# Patient Record
Sex: MALE | Race: WHITE | NOT HISPANIC OR LATINO | Employment: PART TIME | URBAN - METROPOLITAN AREA
[De-identification: names, ages, dates, MRNs, and addresses within clinical notes are randomized per-mention and may not be internally consistent; named-entity substitution may affect disease eponyms.]

---

## 2020-07-19 ENCOUNTER — HOSPITAL ENCOUNTER (EMERGENCY)
Facility: HOSPITAL | Age: 20
Discharge: HOME | End: 2020-07-20
Attending: EMERGENCY MEDICINE
Payer: OTHER MISCELLANEOUS

## 2020-07-19 VITALS
DIASTOLIC BLOOD PRESSURE: 99 MMHG | SYSTOLIC BLOOD PRESSURE: 152 MMHG | TEMPERATURE: 98.8 F | WEIGHT: 205 LBS | OXYGEN SATURATION: 98 % | RESPIRATION RATE: 18 BRPM | HEART RATE: 116 BPM
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
## 2020-07-19 DIAGNOSIS — S00.81XA ABRASION OF FACE, INITIAL ENCOUNTER: ICD-10-CM

## 2020-07-19 PROCEDURE — 99285 EMERGENCY DEPT VISIT HI MDM: CPT | Performed by: EMERGENCY MEDICINE

## 2020-07-19 PROCEDURE — 99284 EMERGENCY DEPT VISIT MOD MDM: CPT

## 2020-07-19 RX ORDER — IBUPROFEN 600 MG/1
600 TABLET ORAL ONCE
Status: COMPLETED | OUTPATIENT
Start: 2020-07-19 | End: 2020-07-19

## 2020-07-19 RX ADMIN — IBUPROFEN 600 MG: 600 TABLET ORAL at 23:04

## 2020-07-20 ENCOUNTER — APPOINTMENT (EMERGENCY)
Dept: RADIOLOGY | Facility: HOSPITAL | Age: 20
End: 2020-07-20
Payer: OTHER MISCELLANEOUS

## 2020-07-20 PROCEDURE — 70486 CT MAXILLOFACIAL W/O DYE: CPT
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
## 2021-04-13 DIAGNOSIS — Z23 ENCOUNTER FOR IMMUNIZATION: ICD-10-CM

## 2021-04-27 ENCOUNTER — IMMUNIZATIONS (OUTPATIENT)
Dept: FAMILY MEDICINE CLINIC | Facility: HOSPITAL | Age: 21
End: 2021-04-27

## 2021-04-27 DIAGNOSIS — Z23 ENCOUNTER FOR IMMUNIZATION: Primary | ICD-10-CM

## 2021-04-27 PROCEDURE — 0001A SARS-COV-2 / COVID-19 MRNA VACCINE (PFIZER-BIONTECH) 30 MCG: CPT

## 2021-04-27 PROCEDURE — 91300 SARS-COV-2 / COVID-19 MRNA VACCINE (PFIZER-BIONTECH) 30 MCG: CPT

## 2021-05-21 ENCOUNTER — IMMUNIZATIONS (OUTPATIENT)
Dept: FAMILY MEDICINE CLINIC | Facility: HOSPITAL | Age: 21
End: 2021-05-21

## 2021-05-21 DIAGNOSIS — Z23 ENCOUNTER FOR IMMUNIZATION: Primary | ICD-10-CM

## 2021-05-21 PROCEDURE — 91300 SARS-COV-2 / COVID-19 MRNA VACCINE (PFIZER-BIONTECH) 30 MCG: CPT

## 2021-05-21 PROCEDURE — 0002A SARS-COV-2 / COVID-19 MRNA VACCINE (PFIZER-BIONTECH) 30 MCG: CPT

## 2021-12-28 ENCOUNTER — IMMUNIZATIONS (OUTPATIENT)
Dept: FAMILY MEDICINE CLINIC | Facility: HOSPITAL | Age: 21
End: 2021-12-28

## 2021-12-28 DIAGNOSIS — Z23 ENCOUNTER FOR IMMUNIZATION: Primary | ICD-10-CM

## 2021-12-28 PROCEDURE — 0001A COVID-19 PFIZER VACC 0.3 ML: CPT

## 2021-12-28 PROCEDURE — 91300 COVID-19 PFIZER VACC 0.3 ML: CPT

## 2023-09-29 ENCOUNTER — TELEPHONE (OUTPATIENT)
Age: 23
End: 2023-09-29

## 2023-09-29 NOTE — TELEPHONE ENCOUNTER
Patient's mother, Beverly Paige, called with patient in the background to access a copy of the patient's immunization report. They will try to access the report through [facility] and give a call back if they are unable to.

## 2023-10-09 ENCOUNTER — APPOINTMENT (OUTPATIENT)
Dept: LAB | Facility: MEDICAL CENTER | Age: 23
End: 2023-10-09

## 2023-10-09 ENCOUNTER — APPOINTMENT (OUTPATIENT)
Dept: URGENT CARE | Facility: MEDICAL CENTER | Age: 23
End: 2023-10-09

## 2024-01-18 ENCOUNTER — OFFICE VISIT (OUTPATIENT)
Dept: FAMILY MEDICINE CLINIC | Facility: CLINIC | Age: 24
End: 2024-01-18
Payer: COMMERCIAL

## 2024-01-18 VITALS
OXYGEN SATURATION: 97 % | DIASTOLIC BLOOD PRESSURE: 82 MMHG | RESPIRATION RATE: 16 BRPM | HEIGHT: 77 IN | BODY MASS INDEX: 35.19 KG/M2 | TEMPERATURE: 99.9 F | HEART RATE: 122 BPM | SYSTOLIC BLOOD PRESSURE: 144 MMHG | WEIGHT: 298 LBS

## 2024-01-18 DIAGNOSIS — J10.1 INFLUENZA A: Primary | ICD-10-CM

## 2024-01-18 LAB
SL AMB POCT RAPID FLU A: POSITIVE
SL AMB POCT RAPID FLU B: NEGATIVE

## 2024-01-18 PROCEDURE — 87804 INFLUENZA ASSAY W/OPTIC: CPT | Performed by: FAMILY MEDICINE

## 2024-01-18 PROCEDURE — 99202 OFFICE O/P NEW SF 15 MIN: CPT | Performed by: FAMILY MEDICINE

## 2024-01-18 RX ORDER — OSELTAMIVIR PHOSPHATE 75 MG/1
75 CAPSULE ORAL EVERY 12 HOURS SCHEDULED
Qty: 10 CAPSULE | Refills: 0 | Status: SHIPPED | OUTPATIENT
Start: 2024-01-18 | End: 2024-01-23

## 2024-01-18 RX ORDER — BROMPHENIRAMINE MALEATE, PSEUDOEPHEDRINE HYDROCHLORIDE, AND DEXTROMETHORPHAN HYDROBROMIDE 2; 30; 10 MG/5ML; MG/5ML; MG/5ML
5 SYRUP ORAL 4 TIMES DAILY PRN
Qty: 120 ML | Refills: 0 | Status: SHIPPED | OUTPATIENT
Start: 2024-01-18

## 2024-01-18 NOTE — ASSESSMENT & PLAN NOTE
Rapid influenza swab performed in the office was very quickly positive for influenza A    -Patient will be placed on Tamiflu 75 mg twice daily x 5 days    -Advised patient to self isolate and avoid contact with other people.  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

## 2024-01-18 NOTE — PROGRESS NOTES
"  Subjective:      Patient ID: Vijay Thomason is a 24 y.o. male.    24-year-old male presents as new patient to the practice as he has not been seen in several years complaining of a 2-day history of nasal congestion, cough, shortness of breath.  Patient works as a .  Works for [employer].  Was in work today.  Started working as a  October.  He did receive flu vaccination.  No COVID-19 test performed.  Has been taking Tylenol.        No past medical history on file.    No family history on file.    No past surgical history on file.     reports that he has never smoked. He has never used smokeless tobacco. He reports that he does not drink alcohol.      Current Outpatient Medications:   •  brompheniramine-pseudoephedrine-DM 30-2-10 MG/5ML syrup, Take 5 mL by mouth 4 (four) times a day as needed for allergies, Disp: 120 mL, Rfl: 0  •  oseltamivir (TAMIFLU) 75 mg capsule, Take 1 capsule (75 mg total) by mouth every 12 (twelve) hours for 5 days, Disp: 10 capsule, Rfl: 0    The following portions of the patient's history were reviewed and updated as appropriate: allergies, current medications, past family history, past medical history, past social history, past surgical history and problem list.    Review of Systems   Constitutional:  Positive for diaphoresis and fatigue. Negative for appetite change, chills and fever.   HENT:  Positive for congestion and sore throat. Negative for ear pain.    Respiratory:  Positive for cough and shortness of breath. Negative for choking, chest tightness, wheezing and stridor.    Cardiovascular: Negative.    Gastrointestinal: Negative.    Neurological:  Negative for headaches.   Hematological:  Negative for adenopathy.           Objective:    /82   Pulse (!) 122   Temp 99.9 °F (37.7 °C) (Tympanic)   Resp 16   Ht 6' 5\" (1.956 m)   Wt 135 kg (298 lb)   SpO2 97%   BMI 35.34 kg/m²      Physical Exam  Vitals and nursing note reviewed.   Constitutional:  "      General: He is not in acute distress.     Appearance: He is well-developed. He is obese. He is ill-appearing. He is not toxic-appearing.      Comments: Appears acutely ill   HENT:      Head: Normocephalic and atraumatic.      Right Ear: There is impacted cerumen.      Left Ear: There is impacted cerumen.      Nose: Mucosal edema, congestion and rhinorrhea present.   Cardiovascular:      Rate and Rhythm: Normal rate and regular rhythm.      Heart sounds: Normal heart sounds.   Pulmonary:      Effort: Pulmonary effort is normal. No respiratory distress.      Breath sounds: Normal breath sounds. No stridor. No wheezing, rhonchi or rales.   Musculoskeletal:      Cervical back: Normal range of motion and neck supple.   Skin:     General: Skin is warm and dry.   Neurological:      Mental Status: He is alert.           Recent Results (from the past 1008 hour(s))   POCT rapid flu A and B    Collection Time: 01/18/24  3:19 PM   Result Value Ref Range    RAPID FLU A positive     RAPID FLU B negative        Assessment/Plan:    Influenza A  Rapid influenza swab performed in the office was very quickly positive for influenza A    -Patient will be placed on Tamiflu 75 mg twice daily x 5 days    -Advised patient to self isolate and avoid contact with other people.  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

## 2024-02-21 PROBLEM — J10.1 INFLUENZA A: Status: RESOLVED | Noted: 2024-01-18 | Resolved: 2024-02-21

## 2024-06-10 ENCOUNTER — APPOINTMENT (EMERGENCY)
Dept: RADIOLOGY | Facility: HOSPITAL | Age: 24
End: 2024-06-10

## 2024-06-10 ENCOUNTER — HOSPITAL ENCOUNTER (EMERGENCY)
Facility: HOSPITAL | Age: 24
Discharge: HOME | End: 2024-06-10
Attending: EMERGENCY MEDICINE | Admitting: EMERGENCY MEDICINE

## 2024-06-10 VITALS
RESPIRATION RATE: 22 BRPM | SYSTOLIC BLOOD PRESSURE: 130 MMHG | DIASTOLIC BLOOD PRESSURE: 67 MMHG | TEMPERATURE: 98.9 F | OXYGEN SATURATION: 97 % | HEART RATE: 98 BPM

## 2024-06-10 DIAGNOSIS — S90.30XA CONTUSION OF FOOT: Primary | ICD-10-CM

## 2024-06-10 DIAGNOSIS — S91.209A AVULSION OF TOENAIL, INITIAL ENCOUNTER: ICD-10-CM

## 2024-06-10 PROCEDURE — 73630 X-RAY EXAM OF FOOT: CPT

## 2024-06-10 PROCEDURE — 90471 IMMUNIZATION ADMIN: CPT

## 2024-06-10 PROCEDURE — 96372 THER/PROPH/DIAG INJ SC/IM: CPT

## 2024-06-10 PROCEDURE — 64450 NJX AA&/STRD OTHER PN/BRANCH: CPT | Performed by: EMERGENCY MEDICINE

## 2024-06-10 PROCEDURE — 90715 TDAP VACCINE 7 YRS/> IM: CPT

## 2024-06-10 PROCEDURE — 99284 EMERGENCY DEPT VISIT MOD MDM: CPT | Performed by: EMERGENCY MEDICINE

## 2024-06-10 PROCEDURE — 99283 EMERGENCY DEPT VISIT LOW MDM: CPT

## 2024-06-10 RX ORDER — LIDOCAINE HYDROCHLORIDE 10 MG/ML
20 INJECTION, SOLUTION EPIDURAL; INFILTRATION; INTRACAUDAL; PERINEURAL ONCE
Status: COMPLETED | OUTPATIENT
Start: 2024-06-10 | End: 2024-06-10

## 2024-06-10 RX ORDER — KETOROLAC TROMETHAMINE 30 MG/ML
30 INJECTION, SOLUTION INTRAMUSCULAR; INTRAVENOUS ONCE
Status: COMPLETED | OUTPATIENT
Start: 2024-06-10 | End: 2024-06-10

## 2024-06-10 RX ADMIN — LIDOCAINE HYDROCHLORIDE 20 ML: 10 INJECTION, SOLUTION EPIDURAL; INFILTRATION; INTRACAUDAL at 18:17

## 2024-06-10 RX ADMIN — KETOROLAC TROMETHAMINE 30 MG: 30 INJECTION, SOLUTION INTRAMUSCULAR; INTRAVENOUS at 18:13

## 2024-06-10 RX ADMIN — TETANUS TOXOID, REDUCED DIPHTHERIA TOXOID AND ACELLULAR PERTUSSIS VACCINE, ADSORBED 0.5 ML: 5; 2.5; 8; 8; 2.5 SUSPENSION INTRAMUSCULAR at 18:14

## 2024-06-10 NOTE — Clinical Note
Vijay Thomason was seen and treated in our emergency department on 6/10/2024.                Diagnosis:     Vijay  .    He may return on this date: 06/12/2024    Can return sooner if feeling ok     If you have any questions or concerns, please don't hesitate to call.      Kaylie Núñez MD    ______________________________           _______________          _______________  Hospital Representative                              Date                                Time

## 2024-06-10 NOTE — ED PROVIDER NOTES
History  Chief Complaint   Patient presents with    Foot Injury     Dropped quoit board on right foot.       24-year-old male presented ED for evaluation of right foot pain.  Approximately 30 minutes ago, patient dropped a 60 pound board on his foot while carrying it, was not wearing shoes or socks.  His right third toe immediately started bleeding, had pain at the area.  Was able to ambulate afterwards.  No paresthesias.  Unknown last Tdap.  Otherwise feels in his normal state of health.        Prior to Admission Medications   Prescriptions Last Dose Informant Patient Reported? Taking?   brompheniramine-pseudoephedrine-DM 30-2-10 MG/5ML syrup   No No   Sig: Take 5 mL by mouth 4 (four) times a day as needed for allergies      Facility-Administered Medications: None       History reviewed. No pertinent past medical history.    History reviewed. No pertinent surgical history.    History reviewed. No pertinent family history.  I have reviewed and agree with the history as documented.    E-Cigarette/Vaping    E-Cigarette Use Never User      E-Cigarette/Vaping Substances     Social History     Tobacco Use    Smoking status: Never    Smokeless tobacco: Never   Vaping Use    Vaping status: Never Used   Substance Use Topics    Alcohol use: Never        Review of Systems   Constitutional:  Negative for chills and fever.   HENT:  Negative for ear pain and sore throat.    Eyes:  Negative for pain and visual disturbance.   Respiratory:  Negative for cough and shortness of breath.    Cardiovascular:  Negative for chest pain and palpitations.   Gastrointestinal:  Negative for abdominal pain and vomiting.   Genitourinary:  Negative for dysuria and hematuria.   Musculoskeletal:  Negative for arthralgias and back pain.        R foot pain   Skin:  Negative for color change and rash.   Neurological:  Negative for seizures and syncope.   All other systems reviewed and are negative.      Physical Exam  ED Triage Vitals   Temperature  Pulse Respirations Blood Pressure SpO2   06/10/24 1731 06/10/24 1731 06/10/24 1731 06/10/24 1731 06/10/24 1731   98.9 °F (37.2 °C) 98 22 130/67 97 %      Temp Source Heart Rate Source Patient Position - Orthostatic VS BP Location FiO2 (%)   06/10/24 1731 06/10/24 1731 06/10/24 1731 06/10/24 1731 --   Oral Monitor Sitting Right arm       Pain Score       06/10/24 1813       3             Orthostatic Vital Signs  Vitals:    06/10/24 1731   BP: 130/67   Pulse: 98   Patient Position - Orthostatic VS: Sitting       Physical Exam  Vitals and nursing note reviewed.   Constitutional:       General: He is not in acute distress.     Appearance: He is well-developed.   HENT:      Head: Normocephalic and atraumatic.   Eyes:      Conjunctiva/sclera: Conjunctivae normal.   Cardiovascular:      Rate and Rhythm: Normal rate and regular rhythm.      Heart sounds: No murmur heard.  Pulmonary:      Effort: Pulmonary effort is normal. No respiratory distress.      Breath sounds: Normal breath sounds.   Abdominal:      Palpations: Abdomen is soft.      Tenderness: There is no abdominal tenderness.   Musculoskeletal:         General: No swelling.      Cervical back: Neck supple.      Comments: R third toe: nail out of nailbed, ecchymosis of nail. Normal ROM of all toes, all neurovascularly intact.   R ankle, R knee, R hip with normal ROM, neurovascularly intact   Skin:     General: Skin is warm and dry.      Capillary Refill: Capillary refill takes less than 2 seconds.   Neurological:      Mental Status: He is alert.   Psychiatric:         Mood and Affect: Mood normal.         ED Medications  Medications   lidocaine (PF) (XYLOCAINE-MPF) 1 % injection 20 mL (20 mL Infiltration Given by Other 6/10/24 1817)   tetanus-diphtheria-acellular pertussis (BOOSTRIX) IM injection 0.5 mL (0.5 mL Intramuscular Given 6/10/24 1814)   ketorolac (TORADOL) injection 30 mg (30 mg Intramuscular Given 6/10/24 1813)       Diagnostic Studies  Results Reviewed        None                   XR foot 3+ views RIGHT   ED Interpretation by Kaylie Núñez MD (06/10 1901)   No fracture or dislocation            Procedures  Nerve block    Date/Time: 6/10/2024 9:09 PM    Performed by: Kaylie Núñez MD  Authorized by: Kaylie Núñez MD  Universal Protocol:  Consent: Verbal consent obtained.  Risks and benefits: risks, benefits and alternatives were discussed  Consent given by: patient  Patient identity confirmed: verbally with patient    Location:     Nerve block body site: R third toe.    Laterality:  Right  Pre-procedure details:     Skin preparation:  Alcohol    Preparation: Patient was prepped and draped in usual sterile fashion    Procedure details (see MAR for exact dosages):     Block needle gauge:  21 G    Anesthetic injected:  Lidocaine 1% w/o epi    Steroid injected:  None    Additive injected:  None    Injection procedure:  Anatomic landmarks identified, anatomic landmarks palpated, incremental injection, introduced needle and negative aspiration for blood  Post-procedure details:     Outcome:  Anesthesia achieved    Patient tolerance of procedure:  Tolerated well, no immediate complications  Laceration repair    Date/Time: 6/10/2024 9:10 PM    Performed by: Kaylie Núñez MD  Authorized by: Kaylie Núñez MD  Anesthesia: nerve block    Anesthesia:  Local Anesthetic: lidocaine 1% without epinephrine  Anesthetic total: 3 mL      Procedure Details:  Preparation: Patient was prepped and draped in the usual sterile fashion.  Irrigation solution: saline  Irrigation method: syringe  Amount of cleaning: extensive  Approximation: close  Approximation difficulty: simple  Dressing: antibiotic ointment and gauze roll  Patient tolerance: patient tolerated the procedure well with no immediate complications  Comments: Nail bed was placed back underneath skin, glued down. No sutures.            ED Course  ED Course as of 06/11/24 0229   Mon Alvin 10, 2024   5338 XR foot 3+ views  RIGHT  No fracture or dislocation   1858 R third toenail was washed out, digital nerve block, 3cc lidocaine 1% w/o epi, glued back down                                       Medical Decision Making  24-year-old male presenting to ED for evaluation of foot pain after dropping a 60 pound board on it prior to ED arrival.    Physical exam with right third toenail out of nailbed, bleeding.  No lacerations noted.    Will obtain XR right foot, update Tdap, Toradol for pain.    Foot was washed out.  Digital nerve block completed, nail bed was glued back into place.  Wound was wrapped in gauze and then [place].  Patient discharged home with outpatient follow-up, strict return precautions.    Amount and/or Complexity of Data Reviewed  Radiology: ordered and independent interpretation performed. Decision-making details documented in ED Course.    Risk  Prescription drug management.          Disposition  Final diagnoses:   Contusion of foot   Avulsion of toenail, initial encounter     Time reflects when diagnosis was documented in both MDM as applicable and the Disposition within this note       Time User Action Codes Description Comment    6/10/2024  6:59 PM Kaylie Núñez Add [S90.30XA] Contusion of foot     6/10/2024  7:01 PM Kaylie Núñez Add [S91.209A] Avulsion of toenail, initial encounter           ED Disposition       ED Disposition   Discharge    Condition   Stable    Date/Time   Mon Alvin 10, 2024  6:59 PM    Comment   Vijay Thomason discharge to home/self care.                   Follow-up Information       Follow up With Specialties Details Why Contact Info    Yahir Vazquez DO Family Medicine Schedule an appointment as soon as possible for a visit today for follow up [address] [facility] [address]  [phone]              Discharge Medication List as of 6/10/2024  7:00 PM        CONTINUE these medications which have NOT CHANGED    Details   brompheniramine-pseudoephedrine-DM 30-2-10 MG/5ML syrup Take 5 mL by  mouth 4 (four) times a day as needed for allergies, Starting Thu 1/18/2024, Normal           No discharge procedures on file.    PDMP Review       None             ED Provider  Attending physically available and evaluated Vijay Thomason. I managed the patient along with the ED Attending.    Electronically Signed by           Kaylie Núñez MD  06/11/24 0225

## 2024-06-10 NOTE — DISCHARGE INSTRUCTIONS
Do not get skin glue wet for the first 24 hours, after that can shower normally, pat dry  Return to ED if any worsening symptoms  Follow-up with your primary care physician  For any pain, can take Tylenol and Motrin.

## 2024-06-11 NOTE — ED ATTENDING ATTESTATION
6/10/2024  IVijay DO, saw and evaluated the patient. I have discussed the patient with the resident/non-physician practitioner and agree with the resident's/non-physician practitioner's findings, Plan of Care, and MDM as documented in the resident's/non-physician practitioner's note, except where noted. All available labs and Radiology studies were reviewed.  I was present for key portions of any procedure(s) performed by the resident/non-physician practitioner and I was immediately available to provide assistance.       At this point I agree with the current assessment done in the Emergency Department.  I have conducted an independent evaluation of this patient a history and physical is as follows:    24-year-old male in the ED for evaluation of right foot injury, he accidentally dropped a 60 pound board on his right third toe, sustaining a nail injury.  There was some bleeding present, and injury to the nail.    On exam, the right foot third toenail is avulsed at the base.  The nail is otherwise intact, mild oozing coming from the base of the nail at the cuticle.  No bony tenderness to the foot or toes.    Digital block performed and toenail was replaced under the cuticle, skin adhesive applied, tolerated well, adequate hemostasis obtained.  Stable for discharge home.  X-ray showed no fracture.    ED Course         Critical Care Time  Procedures

## 2024-09-10 ENCOUNTER — APPOINTMENT (OUTPATIENT)
Dept: LAB | Facility: CLINIC | Age: 24
End: 2024-09-10

## 2024-09-10 DIAGNOSIS — Z00.8 ENCOUNTER FOR OTHER GENERAL EXAMINATION: ICD-10-CM

## 2024-09-10 LAB
CHOLEST SERPL-MCNC: 139 MG/DL
HDLC SERPL-MCNC: 32 MG/DL
LDLC SERPL CALC-MCNC: 86 MG/DL (ref 0–100)
NONHDLC SERPL-MCNC: 107 MG/DL
TRIGL SERPL-MCNC: 106 MG/DL

## 2024-09-10 PROCEDURE — 36415 COLL VENOUS BLD VENIPUNCTURE: CPT

## 2024-09-10 PROCEDURE — 80061 LIPID PANEL: CPT

## 2024-09-10 PROCEDURE — 83036 HEMOGLOBIN GLYCOSYLATED A1C: CPT

## 2024-09-11 LAB
EST. AVERAGE GLUCOSE BLD GHB EST-MCNC: 105 MG/DL
HBA1C MFR BLD: 5.3 %